# Patient Record
Sex: FEMALE | Race: WHITE | Employment: PART TIME | URBAN - METROPOLITAN AREA
[De-identification: names, ages, dates, MRNs, and addresses within clinical notes are randomized per-mention and may not be internally consistent; named-entity substitution may affect disease eponyms.]

---

## 2023-12-24 ENCOUNTER — HOSPITAL ENCOUNTER (OUTPATIENT)
Age: 48
Setting detail: OBSERVATION
Discharge: LEFT BEFORE DISCHARGE | End: 2023-12-24
Attending: EMERGENCY MEDICINE | Admitting: INTERNAL MEDICINE

## 2023-12-24 ENCOUNTER — APPOINTMENT (OUTPATIENT)
Dept: CT IMAGING | Age: 48
End: 2023-12-24

## 2023-12-24 VITALS
WEIGHT: 170 LBS | RESPIRATION RATE: 16 BRPM | DIASTOLIC BLOOD PRESSURE: 87 MMHG | SYSTOLIC BLOOD PRESSURE: 165 MMHG | BODY MASS INDEX: 27.32 KG/M2 | HEART RATE: 79 BPM | HEIGHT: 66 IN | TEMPERATURE: 97.9 F | OXYGEN SATURATION: 99 %

## 2023-12-24 DIAGNOSIS — G45.9 TIA (TRANSIENT ISCHEMIC ATTACK): Primary | ICD-10-CM

## 2023-12-24 LAB
ALBUMIN SERPL-MCNC: 4.1 G/DL (ref 3.5–5)
ALBUMIN/GLOB SERPL: 1.1 (ref 0.4–1.6)
ALP SERPL-CCNC: 75 U/L (ref 50–130)
ALT SERPL-CCNC: 45 U/L (ref 12–65)
ANION GAP SERPL CALC-SCNC: 6 MMOL/L (ref 2–11)
AST SERPL-CCNC: 36 U/L (ref 15–37)
BASOPHILS # BLD: 0.1 K/UL (ref 0–0.2)
BASOPHILS NFR BLD: 1 % (ref 0–2)
BILIRUB SERPL-MCNC: 0.6 MG/DL (ref 0.2–1.1)
BUN SERPL-MCNC: 8 MG/DL (ref 6–23)
CALCIUM SERPL-MCNC: 9.2 MG/DL (ref 8.3–10.4)
CHLORIDE SERPL-SCNC: 107 MMOL/L (ref 103–113)
CO2 SERPL-SCNC: 26 MMOL/L (ref 21–32)
CREAT SERPL-MCNC: 0.92 MG/DL (ref 0.6–1)
DIFFERENTIAL METHOD BLD: ABNORMAL
EKG ATRIAL RATE: 75 BPM
EKG DIAGNOSIS: NORMAL
EKG P AXIS: 67 DEGREES
EKG P-R INTERVAL: 147 MS
EKG Q-T INTERVAL: 389 MS
EKG QRS DURATION: 93 MS
EKG QTC CALCULATION (BAZETT): 432 MS
EKG R AXIS: 80 DEGREES
EKG T AXIS: 40 DEGREES
EKG VENTRICULAR RATE: 74 BPM
EOSINOPHIL # BLD: 0.1 K/UL (ref 0–0.8)
EOSINOPHIL NFR BLD: 1 % (ref 0.5–7.8)
ERYTHROCYTE [DISTWIDTH] IN BLOOD BY AUTOMATED COUNT: 12.7 % (ref 11.9–14.6)
GLOBULIN SER CALC-MCNC: 3.8 G/DL (ref 2.8–4.5)
GLUCOSE BLD STRIP.AUTO-MCNC: 113 MG/DL (ref 65–100)
GLUCOSE SERPL-MCNC: 120 MG/DL (ref 65–100)
HCT VFR BLD AUTO: 44.6 % (ref 35.8–46.3)
HGB BLD-MCNC: 14.6 G/DL (ref 11.7–15.4)
IMM GRANULOCYTES # BLD AUTO: 0 K/UL (ref 0–0.5)
IMM GRANULOCYTES NFR BLD AUTO: 0 % (ref 0–5)
INR BLD: 1 (ref 0.9–1.2)
INR PPP: 1
LYMPHOCYTES # BLD: 1.3 K/UL (ref 0.5–4.6)
LYMPHOCYTES NFR BLD: 22 % (ref 13–44)
MCH RBC QN AUTO: 31 PG (ref 26.1–32.9)
MCHC RBC AUTO-ENTMCNC: 32.7 G/DL (ref 31.4–35)
MCV RBC AUTO: 94.7 FL (ref 82–102)
MONOCYTES # BLD: 0.5 K/UL (ref 0.1–1.3)
MONOCYTES NFR BLD: 9 % (ref 4–12)
NEUTS SEG # BLD: 3.9 K/UL (ref 1.7–8.2)
NEUTS SEG NFR BLD: 66 % (ref 43–78)
NRBC # BLD: 0 K/UL (ref 0–0.2)
PLATELET # BLD AUTO: 245 K/UL (ref 150–450)
PMV BLD AUTO: 8.9 FL (ref 9.4–12.3)
POTASSIUM SERPL-SCNC: 3.5 MMOL/L (ref 3.5–5.1)
PROT SERPL-MCNC: 7.9 G/DL (ref 6.3–8.2)
PROTHROMBIN TIME: 12.9 SEC (ref 11.3–14.9)
PT BLD: 12.4 SECS (ref 9.6–11.6)
RBC # BLD AUTO: 4.71 M/UL (ref 4.05–5.2)
SERVICE CMNT-IMP: ABNORMAL
SODIUM SERPL-SCNC: 139 MMOL/L (ref 136–146)
WBC # BLD AUTO: 5.8 K/UL (ref 4.3–11.1)

## 2023-12-24 PROCEDURE — 85610 PROTHROMBIN TIME: CPT

## 2023-12-24 PROCEDURE — 6360000002 HC RX W HCPCS: Performed by: INTERNAL MEDICINE

## 2023-12-24 PROCEDURE — 85025 COMPLETE CBC W/AUTO DIFF WBC: CPT

## 2023-12-24 PROCEDURE — 82962 GLUCOSE BLOOD TEST: CPT

## 2023-12-24 PROCEDURE — G0378 HOSPITAL OBSERVATION PER HR: HCPCS

## 2023-12-24 PROCEDURE — 94760 N-INVAS EAR/PLS OXIMETRY 1: CPT

## 2023-12-24 PROCEDURE — 97530 THERAPEUTIC ACTIVITIES: CPT

## 2023-12-24 PROCEDURE — 93005 ELECTROCARDIOGRAM TRACING: CPT | Performed by: EMERGENCY MEDICINE

## 2023-12-24 PROCEDURE — 93010 ELECTROCARDIOGRAM REPORT: CPT | Performed by: INTERNAL MEDICINE

## 2023-12-24 PROCEDURE — 96372 THER/PROPH/DIAG INJ SC/IM: CPT

## 2023-12-24 PROCEDURE — 6370000000 HC RX 637 (ALT 250 FOR IP): Performed by: INTERNAL MEDICINE

## 2023-12-24 PROCEDURE — 97161 PT EVAL LOW COMPLEX 20 MIN: CPT

## 2023-12-24 PROCEDURE — 70450 CT HEAD/BRAIN W/O DYE: CPT

## 2023-12-24 PROCEDURE — 99285 EMERGENCY DEPT VISIT HI MDM: CPT

## 2023-12-24 PROCEDURE — 2580000003 HC RX 258: Performed by: INTERNAL MEDICINE

## 2023-12-24 PROCEDURE — 80053 COMPREHEN METABOLIC PANEL: CPT

## 2023-12-24 PROCEDURE — 70498 CT ANGIOGRAPHY NECK: CPT

## 2023-12-24 PROCEDURE — 6360000004 HC RX CONTRAST MEDICATION: Performed by: EMERGENCY MEDICINE

## 2023-12-24 PROCEDURE — 97165 OT EVAL LOW COMPLEX 30 MIN: CPT

## 2023-12-24 PROCEDURE — 6370000000 HC RX 637 (ALT 250 FOR IP): Performed by: EMERGENCY MEDICINE

## 2023-12-24 RX ORDER — ASPIRIN 81 MG/1
81 TABLET ORAL DAILY
Qty: 30 TABLET | Refills: 3 | Status: SHIPPED | OUTPATIENT
Start: 2023-12-25

## 2023-12-24 RX ORDER — POLYETHYLENE GLYCOL 3350 17 G/17G
17 POWDER, FOR SOLUTION ORAL DAILY PRN
Status: DISCONTINUED | OUTPATIENT
Start: 2023-12-24 | End: 2023-12-24 | Stop reason: HOSPADM

## 2023-12-24 RX ORDER — SODIUM CHLORIDE 0.9 % (FLUSH) 0.9 %
5-40 SYRINGE (ML) INJECTION EVERY 12 HOURS SCHEDULED
Status: DISCONTINUED | OUTPATIENT
Start: 2023-12-24 | End: 2023-12-24 | Stop reason: HOSPADM

## 2023-12-24 RX ORDER — CLOPIDOGREL BISULFATE 75 MG/1
75 TABLET ORAL DAILY
Status: DISCONTINUED | OUTPATIENT
Start: 2023-12-25 | End: 2023-12-24

## 2023-12-24 RX ORDER — AMLODIPINE BESYLATE 5 MG/1
5 TABLET ORAL DAILY
Qty: 30 TABLET | Refills: 1 | Status: SHIPPED | OUTPATIENT
Start: 2023-12-24 | End: 2024-01-23

## 2023-12-24 RX ORDER — ASPIRIN 81 MG/1
162 TABLET, CHEWABLE ORAL
Status: COMPLETED | OUTPATIENT
Start: 2023-12-24 | End: 2023-12-24

## 2023-12-24 RX ORDER — SODIUM CHLORIDE 9 MG/ML
INJECTION, SOLUTION INTRAVENOUS PRN
Status: DISCONTINUED | OUTPATIENT
Start: 2023-12-24 | End: 2023-12-24 | Stop reason: HOSPADM

## 2023-12-24 RX ORDER — ENOXAPARIN SODIUM 100 MG/ML
40 INJECTION SUBCUTANEOUS EVERY 24 HOURS
Status: DISCONTINUED | OUTPATIENT
Start: 2023-12-24 | End: 2023-12-24 | Stop reason: HOSPADM

## 2023-12-24 RX ORDER — ONDANSETRON 2 MG/ML
4 INJECTION INTRAMUSCULAR; INTRAVENOUS EVERY 6 HOURS PRN
Status: DISCONTINUED | OUTPATIENT
Start: 2023-12-24 | End: 2023-12-24 | Stop reason: HOSPADM

## 2023-12-24 RX ORDER — SODIUM CHLORIDE 0.9 % (FLUSH) 0.9 %
5-40 SYRINGE (ML) INJECTION PRN
Status: DISCONTINUED | OUTPATIENT
Start: 2023-12-24 | End: 2023-12-24 | Stop reason: HOSPADM

## 2023-12-24 RX ORDER — CLOPIDOGREL BISULFATE 75 MG/1
225 TABLET ORAL ONCE
Status: COMPLETED | OUTPATIENT
Start: 2023-12-24 | End: 2023-12-24

## 2023-12-24 RX ORDER — ATORVASTATIN CALCIUM 40 MG/1
80 TABLET, FILM COATED ORAL NIGHTLY
Status: DISCONTINUED | OUTPATIENT
Start: 2023-12-24 | End: 2023-12-24 | Stop reason: HOSPADM

## 2023-12-24 RX ORDER — CLOPIDOGREL BISULFATE 75 MG/1
75 TABLET ORAL ONCE
Status: COMPLETED | OUTPATIENT
Start: 2023-12-24 | End: 2023-12-24

## 2023-12-24 RX ORDER — LABETALOL HYDROCHLORIDE 5 MG/ML
10 INJECTION, SOLUTION INTRAVENOUS EVERY 6 HOURS PRN
Status: DISCONTINUED | OUTPATIENT
Start: 2023-12-24 | End: 2023-12-24 | Stop reason: HOSPADM

## 2023-12-24 RX ORDER — CLOPIDOGREL BISULFATE 75 MG/1
75 TABLET ORAL DAILY
Status: DISCONTINUED | OUTPATIENT
Start: 2023-12-25 | End: 2023-12-24 | Stop reason: HOSPADM

## 2023-12-24 RX ORDER — ASPIRIN 81 MG/1
81 TABLET ORAL DAILY
Status: DISCONTINUED | OUTPATIENT
Start: 2023-12-25 | End: 2023-12-24 | Stop reason: HOSPADM

## 2023-12-24 RX ORDER — ONDANSETRON 4 MG/1
4 TABLET, ORALLY DISINTEGRATING ORAL EVERY 8 HOURS PRN
Status: DISCONTINUED | OUTPATIENT
Start: 2023-12-24 | End: 2023-12-24 | Stop reason: HOSPADM

## 2023-12-24 RX ORDER — CLOPIDOGREL BISULFATE 75 MG/1
150 TABLET ORAL DAILY
Status: DISCONTINUED | OUTPATIENT
Start: 2023-12-24 | End: 2023-12-24

## 2023-12-24 RX ADMIN — SODIUM CHLORIDE, PRESERVATIVE FREE 10 ML: 5 INJECTION INTRAVENOUS at 13:35

## 2023-12-24 RX ADMIN — CLOPIDOGREL BISULFATE 75 MG: 75 TABLET ORAL at 11:46

## 2023-12-24 RX ADMIN — IOPAMIDOL 50 ML: 755 INJECTION, SOLUTION INTRAVENOUS at 11:08

## 2023-12-24 RX ADMIN — ASPIRIN 162 MG: 81 TABLET, CHEWABLE ORAL at 11:46

## 2023-12-24 RX ADMIN — ENOXAPARIN SODIUM 40 MG: 100 INJECTION SUBCUTANEOUS at 13:27

## 2023-12-24 RX ADMIN — CLOPIDOGREL BISULFATE 225 MG: 75 TABLET ORAL at 13:28

## 2023-12-24 NOTE — ICUWATCH
Responded to Code S in ER. Patient reports LKW at 2100 yesterday. Patient has been having left sided numbness and paresthesias since this time. Patient checked BP and it was in the 170s. BGL normal, PT/INR normal. Patient with hx of anxiety. Patient saw teleneuro at ~1100. Full NIH and bedside exam performed with Dr. Deuce Stoll. NIH 0, no left sided deficits at this time. Patient to be admitted for full stroke workup, not a candidate for TNK.     Outreach RN  Celeste Escobar  [phone]

## 2023-12-24 NOTE — ED NOTES
TRANSFER - OUT REPORT:    Verbal report given to [facility] OF [facility] RN on Ricardo Cortez  being transferred to [address] [place] [address] for routine progression of patient care       Report consisted of patient's Situation, Background, Assessment and   Recommendations(SBAR). Information from the following report(s) Nurse Handoff Report, ED Encounter Summary, ED SBAR, Adult Overview, Intake/Output, MAR, Recent Results, Quality Measures, and Neuro Assessment was reviewed with the receiving nurse. Vicco Fall Assessment:    Presents to emergency department  because of falls (Syncope, seizure, or loss of consciousness): No  Age > 70: No  Altered Mental Status, Intoxication with alcohol or substance confusion (Disorientation, impaired judgment, poor safety awaremess, or inability to follow instructions): No  Impaired Mobility: Ambulates or transfers with assistive devices or assistance; Unable to ambulate or transer.: No  Nursing Judgement: No          Lines:   Peripheral IV 12/24/23 Right Antecubital (Active)        Opportunity for questions and clarification was provided.       Patient transported with:  Registered Nurse

## 2023-12-24 NOTE — CARE COORDINATION
CM spoke with patient at bedside for discharge planning. Demographics verified. Patient is independent at baseline. She is currently uninsured, but states she will have insurance after Jan. 1. CM provided self pay resources, including [employer]; also provided her with a financial assistance application. Patient denies other needs at this time. She is hopeful to be discharged home later today. Case management will remain available. 12/24/23 1650   Service Assessment   Patient Orientation Alert and Oriented   Cognition Alert   History Provided By Patient   Primary Caregiver Self   Support Systems Spouse/Significant Other   PCP Verified by CM Yes   Prior Functional Level Independent in ADLs/IADLs   Current Functional Level Independent in ADLs/IADLs   Can patient return to prior living arrangement Yes   Ability to make needs known: Good   Family able to assist with home care needs: Yes   Would you like for me to discuss the discharge plan with any other family members/significant others, and if so, who?  No

## 2023-12-24 NOTE — H&P
Hospitalist History and Physical   Admit Date:  2023 10:35 AM   Name:  Adonay Gonzales   Age:  46 y.o. Sex:  female  :  1975   MRN:  [id number]   Room:  [facility]    Presenting/Chief Complaint: Hypertension and Numbness     Reason(s) for Admission: TIA (transient ischemic attack) [G45.9]     History of Present Illness: Adonay Gonzales is a 46 y.o. female with no past medical history presents with left arm weakness/numbness that started around 9 PM last night. She has also had high blood pressure \"that is new, usually my blood pressure is like 140-150. \" No chest pain or shortness of breath. No weakness/numbness or tingling in arms or legs. No headache, blurry vision, or difficulty with speech or swallowing. ED Course: Code S called. STAT CT imaging and CTA both negative. Teleneurology recommending inpatient admission for CVA ruleout. Not a candidate for thrombolytics. Given ASA and Plavix. Hospitalists consulted for admission. 10 point ROS negative except for HPI above. Assessment & Plan:     Principal Problem:    TIA (transient ischemic attack)  - not a candidate for tPA  - CT/CTA brain negative  - MRI brain/C-spine ordered and pending  - TTE pending  - neuro checks and telemetry  - lipid panel and A1c  - Plavix and ASA  - high intensity   - PT/OT/SLP    PT/OT evals and PPD needed/ordered? Yes  Diet: ADULT DIET; Regular; 4 carb choices (60 gm/meal)  VTE prophylaxis: Lovenox  Code status: Full Code      Non-peripheral Lines and Tubes (if present):             Hospital Problems:  Principal Problem:    TIA (transient ischemic attack)  Resolved Problems:    * No resolved hospital problems. *      Past History:     Past Medical History:   Diagnosis Date    Anxiety        No past surgical history on file.      Social History     Tobacco Use    Smoking status: Not on file    Smokeless tobacco: Not on file   Substance Use Topics    Alcohol use: Not on file      Social History     Substance and Sexual

## 2023-12-24 NOTE — ED TRIAGE NOTES
Pt reports high blood pressure x 2 days. 170/105 at home. Pt reports dry mouth, not feeling well.  Denies bp history

## 2023-12-25 NOTE — DISCHARGE SUMMARY
Hospitalist Discharge Summary   Admit Date:  2023 10:35 AM   DC Note date: 2023  Name:  Tiny Alves   Age:  46 y.o. Sex:  female  :  1975   MRN:  [id number]   Room:  [facility]  PCP:  Mica Baeza MD    Presenting Complaint: Hypertension and Numbness     Initial Admission Diagnosis: TIA (transient ischemic attack) [G45.9]     Problem List for this Hospitalization (present on admission):    Principal Problem:    TIA (transient ischemic attack)  Resolved Problems:    * No resolved hospital problems. *    Tiny Alves is a 46 y.o. female with no past medical history presents with left arm weakness/numbness that started around 9 PM last night. She has also had high blood pressure \"that is new, usually my blood pressure is like 140-150. \" No chest pain or shortness of breath. No weakness/numbness or tingling in arms or legs. No headache, blurry vision, or difficulty with speech or swallowing. ED Course: Code S called. STAT CT imaging and CTA both negative. Teleneurology recommending inpatient admission for CVA ruleout. Not a candidate for thrombolytics. Given ASA and Plavix. Hospitalists consulted for admission. Hospital Course:  Patient admitted above for CVA rule out. Wants to go home and leave AMA and is concerned about finances, does not have insurance and wants to establish care with primary care after the 1st of the year. Explained risk of leaving (undiagnosed CVA, high blood pressure, death), patient understands these risks. Will give script for amlodipine 5 mg (had a bad reaction to lisinopril previously), this medication is free at [facility]. Recommend adding baby aspirin 81 mg. Also counseled to watch blood pressure at home. Details of hospitalization discussed with patient who understands, counseled that she needs a primary care physician after discharge. Return precautions provided. All questions answered. Disposition: Left Against Medical Advice (AMA)  Diet: ADULT DIET;  Regular;